# Patient Record
Sex: MALE | Race: WHITE | NOT HISPANIC OR LATINO | Employment: OTHER | URBAN - METROPOLITAN AREA
[De-identification: names, ages, dates, MRNs, and addresses within clinical notes are randomized per-mention and may not be internally consistent; named-entity substitution may affect disease eponyms.]

---

## 2023-10-11 ENCOUNTER — OFFICE VISIT (OUTPATIENT)
Dept: PRIMARY CARE | Facility: CLINIC | Age: 78
End: 2023-10-11
Payer: MEDICARE

## 2023-10-11 DIAGNOSIS — Z00.00 MEDICARE ANNUAL WELLNESS VISIT, SUBSEQUENT: Primary | ICD-10-CM

## 2023-10-11 DIAGNOSIS — R55 SYNCOPE, UNSPECIFIED SYNCOPE TYPE: ICD-10-CM

## 2023-10-11 DIAGNOSIS — I77.819 AORTIC ECTASIA, UNSPECIFIED SITE (CMS-HCC): ICD-10-CM

## 2023-10-11 DIAGNOSIS — R94.31 ABNORMAL ECG: ICD-10-CM

## 2023-10-11 DIAGNOSIS — R00.2 PALPITATIONS: ICD-10-CM

## 2023-10-11 LAB
NON-UH HIE A/G RATIO: 1.4
NON-UH HIE ALB: 4.3 G/DL (ref 3.4–5)
NON-UH HIE ALK PHOS: 87 UNIT/L (ref 46–116)
NON-UH HIE BILIRUBIN, TOTAL: 0.5 MG/DL (ref 0.2–1)
NON-UH HIE BUN/CREAT RATIO: 14
NON-UH HIE BUN: 14 MG/DL (ref 9–23)
NON-UH HIE CALCIUM: 9.5 MG/DL (ref 8.7–10.4)
NON-UH HIE CALCULATED OSMOLALITY: 279 MOSM/KG (ref 275–295)
NON-UH HIE CHLORIDE: 103 MMOL/L (ref 98–107)
NON-UH HIE CO2, VENOUS: 29 MMOL/L (ref 20–31)
NON-UH HIE CREATININE: 1 MG/DL (ref 0.6–1.1)
NON-UH HIE GFR AA: >60
NON-UH HIE GLOBULIN: 3 G/DL
NON-UH HIE GLOMERULAR FILTRATION RATE: >60 ML/MIN/1.73M?
NON-UH HIE GLUCOSE: 152 MG/DL (ref 74–106)
NON-UH HIE GOT: 17 UNIT/L (ref 15–37)
NON-UH HIE GPT: 16 UNIT/L (ref 10–49)
NON-UH HIE HCT: 45.7 % (ref 41–52)
NON-UH HIE HGB: 15.3 G/DL (ref 13.5–17.5)
NON-UH HIE INSTR WBC ND: 5.3
NON-UH HIE K: 4.8 MMOL/L (ref 3.5–5.1)
NON-UH HIE MCH: 31.2 PG (ref 27–34)
NON-UH HIE MCHC: 33.6 G/DL (ref 32–37)
NON-UH HIE MCV: 93 FL (ref 80–100)
NON-UH HIE MPV: 8.1 FL (ref 7.4–10.4)
NON-UH HIE NA: 138 MMOL/L (ref 135–145)
NON-UH HIE PLATELET: 214 X10 (ref 150–450)
NON-UH HIE RBC: 4.91 X10 (ref 4.7–6.1)
NON-UH HIE RDW: 13.5 % (ref 11.5–14.5)
NON-UH HIE TOTAL PROTEIN: 7.3 G/DL (ref 5.7–8.2)
NON-UH HIE TSH: 3.71 UIU/ML (ref 0.55–4.78)
NON-UH HIE WBC: 5.3 X10 (ref 4.5–11)

## 2023-10-11 PROCEDURE — 93000 ELECTROCARDIOGRAM COMPLETE: CPT | Performed by: FAMILY MEDICINE

## 2023-10-11 PROCEDURE — 3074F SYST BP LT 130 MM HG: CPT | Performed by: FAMILY MEDICINE

## 2023-10-11 PROCEDURE — 99214 OFFICE O/P EST MOD 30 MIN: CPT | Performed by: FAMILY MEDICINE

## 2023-10-11 PROCEDURE — G0439 PPPS, SUBSEQ VISIT: HCPCS | Performed by: FAMILY MEDICINE

## 2023-10-11 PROCEDURE — 3078F DIAST BP <80 MM HG: CPT | Performed by: FAMILY MEDICINE

## 2023-10-11 PROCEDURE — 1170F FXNL STATUS ASSESSED: CPT | Performed by: FAMILY MEDICINE

## 2023-10-11 PROCEDURE — 1159F MED LIST DOCD IN RCRD: CPT | Performed by: FAMILY MEDICINE

## 2023-10-11 PROCEDURE — 1036F TOBACCO NON-USER: CPT | Performed by: FAMILY MEDICINE

## 2023-10-11 PROCEDURE — 1160F RVW MEDS BY RX/DR IN RCRD: CPT | Performed by: FAMILY MEDICINE

## 2023-10-11 RX ORDER — INSULIN GLARGINE 100 [IU]/ML
18 INJECTION, SOLUTION SUBCUTANEOUS EVERY MORNING
COMMUNITY

## 2023-10-11 RX ORDER — METFORMIN HYDROCHLORIDE 500 MG/1
500 TABLET, EXTENDED RELEASE ORAL DAILY
COMMUNITY

## 2023-10-11 RX ORDER — GLIPIZIDE 5 MG/1
5 TABLET ORAL 2 TIMES DAILY
COMMUNITY
Start: 2023-05-18

## 2023-10-11 ASSESSMENT — ACTIVITIES OF DAILY LIVING (ADL)
DOING_HOUSEWORK: INDEPENDENT
GROCERY_SHOPPING: INDEPENDENT
DRESSING: INDEPENDENT
TAKING_MEDICATION: INDEPENDENT
BATHING: INDEPENDENT
MANAGING_FINANCES: INDEPENDENT

## 2023-10-11 ASSESSMENT — PATIENT HEALTH QUESTIONNAIRE - PHQ9
2. FEELING DOWN, DEPRESSED OR HOPELESS: NOT AT ALL
2. FEELING DOWN, DEPRESSED OR HOPELESS: NOT AT ALL
SUM OF ALL RESPONSES TO PHQ9 QUESTIONS 1 AND 2: 0
SUM OF ALL RESPONSES TO PHQ9 QUESTIONS 1 AND 2: 0
1. LITTLE INTEREST OR PLEASURE IN DOING THINGS: NOT AT ALL
1. LITTLE INTEREST OR PLEASURE IN DOING THINGS: NOT AT ALL

## 2023-10-11 NOTE — PROGRESS NOTES
Subjective   Reason for Visit: John Paul Barajas is an 78 y.o. male here for a Medicare Wellness visit.     Past Medical, Surgical, and Family History reviewed and updated in chart.    Reviewed all medications by prescribing practitioner or clinical pharmacist (such as prescriptions, OTCs, herbal therapies and supplements) and documented in the medical record.    Very pleasant 78 year old here for syncope  History of hyperlipidemia and hypertension and type 2 diabetes  Severe arthritis   Had some deliurium associated with covid previously which resolved  Normal activity felt very weak and woke up after a few minutes  No seizure activity noted, and no head trauma  No chest pain or shortness of breath prior   No recent illness     Syncope  This is a new problem. The current episode started 1 to 4 weeks ago. The problem occurs intermittently. The problem has been unchanged. He lost consciousness for a period of less than 1 minute. The symptoms are aggravated by normal activity. Associated symptoms include dizziness and weakness. Pertinent negatives include no abdominal pain, auditory change, aura, back pain, bladder incontinence, bowel incontinence, chest pain, clumsiness, confusion, diaphoresis, fever, focal sensory loss, focal weakness, headaches, light-headedness, malaise/fatigue, nausea, palpitations, slurred speech, vertigo, visual change or vomiting. He has tried nothing for the symptoms. The treatment provided no relief. His past medical history is significant for DM. There is no history of arrhythmia, CAD, a clotting disorder, CVA, HTN, seizures, a sudden death in family, TIA or vertigo.       Patient Self Assessment of Health Status  Patient Self Assessment: Fair    Nutrition and Exercise  Current Diet: Well Balanced Diet  Adequate Fluid Intake: Yes  Caffeine: Yes  Exercise Frequency: Infrequently    Functional Ability/Level of Safety  Cognitive Impairment Observed: No cognitive impairment observed  Cognitive  Impairment Reported: Cognitive impairment reported by patient or family    Home Safety Risk Factors: None         Patient Care Team:  Gracie Adair MD as PCP - General  Gracie Adair MD as PCP - [facility]     Review of Systems   Constitutional:  Negative for diaphoresis, fever and malaise/fatigue.   Cardiovascular:  Positive for syncope. Negative for chest pain and palpitations.   Gastrointestinal:  Negative for abdominal pain, bowel incontinence, nausea and vomiting.   Genitourinary:  Negative for bladder incontinence.   Musculoskeletal:  Negative for back pain.   Neurological:  Positive for dizziness and weakness. Negative for vertigo, focal weakness, light-headedness and headaches.   Psychiatric/Behavioral:  Negative for confusion.      Constitutional: no chills, no fever and no night sweats.   Eyes: no blurred vision and no eyesight problems.   ENT: no hearing loss, no nasal congestion, no nasal discharge, no hoarseness and no sore throat.   Cardiovascular: no chest pain, no intermittent leg claudication, no lower extremity edema, no palpitations and no syncope.   Respiratory: no cough, no shortness of breath during exertion, no shortness of breath at rest and no wheezing.   Gastrointestinal: no abdominal pain, no blood in stools, no constipation, no diarrhea, no melena, no nausea, no rectal pain and no vomiting.   Genitourinary: no dysuria, no change in urinary frequency, no urinary hesitancy, no feelings of urinary urgency and no vaginal discharge.   Musculoskeletal: no arthralgias,  no back pain and no myalgias.   Integumentary: no new skin lesions and no rashes.   Neurological: no difficulty walking, no headache, no limb weakness, no numbness and no tingling.   Psychiatric: no anxiety, no depression, no anhedonia and no substance use disorders.   Endocrine: no recent weight gain and no recent weight loss.   Hematologic/Lymphatic: no tendency for easy bruising and no swollen glands  ".    Objective     Vitals:  /52   Pulse 72   Temp 36.7 °C (98 °F)   Ht 1.702 m (5' 7\")   Wt 79.4 kg (175 lb)   BMI 27.41 kg/m²       Physical Exam  The patient appeared well nourished and normally developed. Vital signs as documented. Head exam is unremarkable. No scleral icterus or corneal arcus noted.  Pupils are equal round reactive to light extraocular movements are intact no hemorrhages noted on funduscopic exam mouth mucous membranes are moist no exudates ears canals clear TMs are gray pearly not injected nose no rhinorrhea or epistaxis Neck is without jugular venous distension, thyromegaly, or carotid bruits. Carotid upstrokes are brisk bilaterally. Lungs are clear to auscultation and percussion. Cardiac exam reveals the PMI to be normally sized and situated. Rhythm is regular. First and second heart sounds normal. No murmurs, rubs or gallops. Abdominal exam reveals normal bowel sounds, no masses, no organomegaly and no aortic enlargement. Extremities are nonedematous and both femoral and pedal pulses are normal.  Neurologic exam DTRs are equal bilaterally no focal deficits strength is symmetrical heme lymph no palpable lymph nodes in the neck axilla or groin  Degenerative changes in the joints   Assessment/Plan   Problem List Items Addressed This Visit       Aortic ectasia, unspecified site (CMS/[facility])    Current Assessment & Plan     Stable and asymptomatic based on symptoms and results  Continue to monitor every 6 months         Medicare annual wellness visit, subsequent - Primary    Current Assessment & Plan     Continue annual exams          Palpitations    Relevant Orders    TSH    ECG 12 Lead (Completed)    Abnormal ECG    Relevant Orders    Echocardiogram Stress Test    Referral to Cardiology    Syncope    Current Assessment & Plan     Syncopal episode   Evaluated for neurologic, cardiac, or metabolic causes  Fall precautions in the interim         Relevant Orders    CT head wo IV contrast    " Comprehensive Metabolic Panel    CBC    ECG 12 Lead (Completed)    Echocardiogram Stress Test    Referral to Cardiology

## 2023-10-15 NOTE — RESULT ENCOUNTER NOTE
X-ray of your head shows a small hematoma or bruise from the fall  There is no evidence of stroke or tumor  There are some age-related changes  I could not find anything to explain the falling episodes I think that is most likely cardiac and I would like you to see a cardiologist but I would also like you to see a neurologist for the hematoma  Excuse me I mean a neurosurgeon  Just to monitor this issue  I will put the referral in your chart

## 2023-10-15 NOTE — RESULT ENCOUNTER NOTE
Patient  Other than a high blood sugar which is consistent with diabetes his blood work is normal  His thyroid is normal  His complete blood count shows no anemia or leukemia or infection in his liver and kidney function and electrolytes are normal and stable

## 2023-10-20 ENCOUNTER — TELEPHONE (OUTPATIENT)
Dept: PRIMARY CARE | Facility: CLINIC | Age: 78
End: 2023-10-20
Payer: MEDICARE

## 2023-10-20 NOTE — TELEPHONE ENCOUNTER
----- Message from Gracie Adair MD sent at 10/15/2023  5:52 PM EDT -----  X-ray of your head shows a small hematoma or bruise from the fall  There is no evidence of stroke or tumor  There are some age-related changes  I could not find anything to explain the falling episodes I think that is most likely cardiac and I would like you to see a cardiologist but I would also like you to see a neurologist for the hematoma  Excuse me I mean a neurosurgeon  Just to monitor this issue  I will put the referral in your chart

## 2023-10-20 NOTE — TELEPHONE ENCOUNTER
----- Message from Gracie Adair MD sent at 10/15/2023  5:50 PM EDT -----  Patient  Other than a high blood sugar which is consistent with diabetes his blood work is normal  His thyroid is normal  His complete blood count shows no anemia or leukemia or infection in his liver and kidney function and electrolytes are normal and stable

## 2023-10-31 VITALS
HEIGHT: 67 IN | DIASTOLIC BLOOD PRESSURE: 52 MMHG | SYSTOLIC BLOOD PRESSURE: 110 MMHG | HEART RATE: 72 BPM | TEMPERATURE: 98 F | WEIGHT: 175 LBS | BODY MASS INDEX: 27.47 KG/M2

## 2023-10-31 PROBLEM — B95.8 STAPH INFECTION: Status: RESOLVED | Noted: 2023-10-31 | Resolved: 2023-10-31

## 2023-10-31 PROBLEM — G47.33 OSA (OBSTRUCTIVE SLEEP APNEA): Status: ACTIVE | Noted: 2019-05-06

## 2023-10-31 PROBLEM — M81.0 OSTEOPOROSIS: Status: ACTIVE | Noted: 2023-10-31

## 2023-10-31 PROBLEM — M47.814 THORACIC SPONDYLOSIS: Status: ACTIVE | Noted: 2023-10-31

## 2023-10-31 PROBLEM — E11.65 TYPE 2 DIABETES MELLITUS WITH HYPERGLYCEMIA (MULTI): Status: RESOLVED | Noted: 2023-03-17 | Resolved: 2023-10-31

## 2023-10-31 PROBLEM — E11.9 DIABETES MELLITUS (MULTI): Status: ACTIVE | Noted: 2023-10-31

## 2023-10-31 PROBLEM — E11.65 TYPE 2 DIABETES MELLITUS WITH HYPERGLYCEMIA (MULTI): Status: ACTIVE | Noted: 2023-03-17

## 2023-10-31 PROBLEM — G47.00 INSOMNIA: Status: ACTIVE | Noted: 2023-10-31

## 2023-10-31 PROBLEM — R00.2 PALPITATIONS: Status: ACTIVE | Noted: 2023-10-31

## 2023-10-31 PROBLEM — R55 SYNCOPE: Status: ACTIVE | Noted: 2023-10-31

## 2023-10-31 PROBLEM — I77.819 AORTIC ECTASIA, UNSPECIFIED SITE (CMS-HCC): Status: ACTIVE | Noted: 2023-10-31

## 2023-10-31 PROBLEM — R26.2 DIFFICULTY IN WALKING, NOT ELSEWHERE CLASSIFIED: Status: RESOLVED | Noted: 2023-03-17 | Resolved: 2023-10-31

## 2023-10-31 PROBLEM — Z28.310 UNVACCINATED FOR COVID-19: Status: ACTIVE | Noted: 2023-03-17

## 2023-10-31 PROBLEM — U07.1 COVID-19: Status: RESOLVED | Noted: 2023-10-31 | Resolved: 2023-10-31

## 2023-10-31 PROBLEM — R05.9 COUGH: Status: RESOLVED | Noted: 2023-10-31 | Resolved: 2023-10-31

## 2023-10-31 PROBLEM — S22.009A THORACIC VERTEBRAL FRACTURE (MULTI): Status: RESOLVED | Noted: 2023-10-31 | Resolved: 2023-10-31

## 2023-10-31 PROBLEM — I10 ESSENTIAL (PRIMARY) HYPERTENSION: Status: ACTIVE | Noted: 2023-03-17

## 2023-10-31 PROBLEM — K21.9 GASTRO-ESOPHAGEAL REFLUX DISEASE WITHOUT ESOPHAGITIS: Status: ACTIVE | Noted: 2023-03-17

## 2023-10-31 PROBLEM — Z98.890 POST-OPERATIVE STATE: Status: RESOLVED | Noted: 2019-08-12 | Resolved: 2023-10-31

## 2023-10-31 PROBLEM — R39.11 URINARY HESITANCY: Status: RESOLVED | Noted: 2023-10-31 | Resolved: 2023-10-31

## 2023-10-31 PROBLEM — M19.071 ARTHRITIS OF RIGHT SUBTALAR JOINT: Status: RESOLVED | Noted: 2019-05-06 | Resolved: 2023-10-31

## 2023-10-31 PROBLEM — E55.9 VITAMIN D DEFICIENCY: Status: ACTIVE | Noted: 2023-10-31

## 2023-10-31 PROBLEM — Z96.611 PRESENCE OF RIGHT ARTIFICIAL SHOULDER JOINT: Status: ACTIVE | Noted: 2023-03-17

## 2023-10-31 PROBLEM — R10.13 EPIGASTRIC PAIN: Status: RESOLVED | Noted: 2023-10-31 | Resolved: 2023-10-31

## 2023-10-31 PROBLEM — J96.01 ACUTE RESPIRATORY FAILURE WITH HYPOXIA (MULTI): Status: RESOLVED | Noted: 2023-10-31 | Resolved: 2023-10-31

## 2023-10-31 PROBLEM — R94.31 ABNORMAL ECG: Status: ACTIVE | Noted: 2023-10-31

## 2023-10-31 PROBLEM — H26.9 CATARACT: Status: ACTIVE | Noted: 2023-10-31

## 2023-10-31 PROBLEM — M89.49 OSTEOARTHROSIS INVOLVING MULTIPLE SITES BUT NOT DESIGNATED AS GENERALIZED: Status: ACTIVE | Noted: 2023-10-31

## 2023-10-31 PROBLEM — S22.009A THORACIC VERTEBRAL FRACTURE (MULTI): Status: ACTIVE | Noted: 2023-10-31

## 2023-10-31 PROBLEM — J18.9 PNEUMONIA: Status: RESOLVED | Noted: 2023-10-31 | Resolved: 2023-10-31

## 2023-10-31 PROBLEM — E78.5 HYPERLIPIDEMIA: Status: ACTIVE | Noted: 2023-10-31

## 2023-10-31 PROBLEM — M19.079 ARTHRITIS OF MIDFOOT: Status: RESOLVED | Noted: 2019-05-06 | Resolved: 2023-10-31

## 2023-10-31 PROBLEM — G25.81 RESTLESS LEG SYNDROME: Status: ACTIVE | Noted: 2023-10-31

## 2023-10-31 PROBLEM — S82.899A ANKLE FRACTURE: Status: RESOLVED | Noted: 2023-10-31 | Resolved: 2023-10-31

## 2023-10-31 PROBLEM — N40.0 ENLARGED PROSTATE: Status: ACTIVE | Noted: 2019-05-06

## 2023-10-31 PROBLEM — S32.501D: Status: ACTIVE | Noted: 2023-03-17

## 2023-10-31 PROBLEM — Z98.1 S/P ANKLE ARTHRODESIS: Status: ACTIVE | Noted: 2019-05-06

## 2023-10-31 PROBLEM — J96.01 ACUTE RESPIRATORY FAILURE WITH HYPOXIA (MULTI): Status: ACTIVE | Noted: 2023-10-31

## 2023-10-31 PROBLEM — K27.9 PEPTIC ULCER, SITE UNSPECIFIED, UNSPECIFIED AS ACUTE OR CHRONIC, WITHOUT HEMORRHAGE OR PERFORATION: Status: RESOLVED | Noted: 2023-03-17 | Resolved: 2023-10-31

## 2023-10-31 PROBLEM — Z00.00 MEDICARE ANNUAL WELLNESS VISIT, SUBSEQUENT: Status: ACTIVE | Noted: 2023-10-31

## 2023-10-31 PROBLEM — M54.2 CERVICALGIA: Status: ACTIVE | Noted: 2023-10-31

## 2023-10-31 PROBLEM — R51.9 TEMPORAL HEADACHE: Status: RESOLVED | Noted: 2023-10-31 | Resolved: 2023-10-31

## 2023-10-31 ASSESSMENT — ENCOUNTER SYMPTOMS
ABDOMINAL PAIN: 0
FOCAL SENSORY LOSS: 0
VISUAL CHANGE: 0
NAUSEA: 0
LIGHT-HEADEDNESS: 0
AURA: 0
DIAPHORESIS: 0
FOCAL WEAKNESS: 0
HEADACHES: 0
VOMITING: 0
DIZZINESS: 1
WEAKNESS: 1
FEVER: 0
SLURRED SPEECH: 0
CLUMSINESS: 0
VERTIGO: 0
CONFUSION: 0
BOWEL INCONTINENCE: 0
PALPITATIONS: 0
BACK PAIN: 0
SYNCOPE: 1

## 2023-10-31 NOTE — ASSESSMENT & PLAN NOTE
Syncopal episode   Evaluated for neurologic, cardiac, or metabolic causes  Fall precautions in the interim

## 2025-04-23 LAB — HEMOGLOBIN A1C/HEMOGLOBIN TOTAL IN BLOOD EXTERNAL: 8.8 %

## 2025-05-13 LAB — NON-UH HIE PROSTATIC SPECIFIC ANTIGEN: 2.9 NG/ML (ref 0–4)
